# Patient Record
Sex: FEMALE | Race: WHITE | Employment: FULL TIME | URBAN - METROPOLITAN AREA
[De-identification: names, ages, dates, MRNs, and addresses within clinical notes are randomized per-mention and may not be internally consistent; named-entity substitution may affect disease eponyms.]

---

## 2018-03-22 ENCOUNTER — APPOINTMENT (OUTPATIENT)
Dept: CT IMAGING | Age: 53
End: 2018-03-22
Payer: COMMERCIAL

## 2018-03-22 ENCOUNTER — APPOINTMENT (OUTPATIENT)
Dept: GENERAL RADIOLOGY | Age: 53
End: 2018-03-22
Payer: COMMERCIAL

## 2018-03-22 ENCOUNTER — HOSPITAL ENCOUNTER (EMERGENCY)
Age: 53
Discharge: HOME | End: 2018-03-22
Attending: FAMILY MEDICINE
Payer: COMMERCIAL

## 2018-03-22 VITALS
RESPIRATION RATE: 16 BRPM | WEIGHT: 293 LBS | SYSTOLIC BLOOD PRESSURE: 178 MMHG | HEIGHT: 63 IN | TEMPERATURE: 98.7 F | BODY MASS INDEX: 51.91 KG/M2 | DIASTOLIC BLOOD PRESSURE: 80 MMHG | OXYGEN SATURATION: 93 % | HEART RATE: 64 BPM

## 2018-03-22 DIAGNOSIS — J20.9 ACUTE BRONCHITIS, UNSPECIFIED ORGANISM: Primary | ICD-10-CM

## 2018-03-22 PROCEDURE — 99284 EMERGENCY DEPT VISIT MOD MDM: CPT

## 2018-03-22 PROCEDURE — 71046 X-RAY EXAM CHEST 2 VIEWS: CPT

## 2018-03-22 RX ORDER — BENZONATATE 200 MG/1
200 CAPSULE ORAL 3 TIMES DAILY PRN
Qty: 15 CAPSULE | Refills: 0 | Status: SHIPPED | OUTPATIENT
Start: 2018-03-22 | End: 2018-03-29

## 2018-03-22 RX ORDER — AZITHROMYCIN 250 MG/1
TABLET, FILM COATED ORAL
Qty: 1 PACKET | Refills: 0 | Status: SHIPPED | OUTPATIENT
Start: 2018-03-22 | End: 2019-09-09

## 2018-03-22 RX ORDER — OMEPRAZOLE 20 MG/1
20 CAPSULE, DELAYED RELEASE ORAL EVERY 12 HOURS
Qty: 28 CAPSULE | Refills: 0 | Status: SHIPPED | OUTPATIENT
Start: 2018-03-22 | End: 2021-05-07

## 2018-03-22 RX ORDER — ALBUTEROL SULFATE 90 UG/1
2 AEROSOL, METERED RESPIRATORY (INHALATION) EVERY 6 HOURS PRN
Qty: 1 INHALER | Refills: 0 | Status: SHIPPED | OUTPATIENT
Start: 2018-03-22 | End: 2019-08-16 | Stop reason: SDUPTHER

## 2018-03-22 RX ORDER — NAPROXEN 500 MG/1
500 TABLET ORAL 2 TIMES DAILY WITH MEALS
Status: ON HOLD | COMMUNITY
End: 2021-10-14 | Stop reason: HOSPADM

## 2018-03-22 NOTE — ED NOTES
Patient present with cough and congestion x 1 week, productive of clear sputum .      Viola Jimenez, EDGARDO  03/22/18 0339

## 2018-03-22 NOTE — ED NOTES
Discharge instructions provided and reviewed. Pt verbalizes understanding and any questions answered at this time. Ambulated out without assistance.       Dawson Marquez RN  03/22/18 3848

## 2018-05-16 PROBLEM — G47.33 OSA ON CPAP: Status: ACTIVE | Noted: 2018-05-16

## 2018-05-16 PROBLEM — J45.20 MILD INTERMITTENT ASTHMA WITHOUT COMPLICATION: Status: ACTIVE | Noted: 2018-05-16

## 2018-05-16 PROBLEM — Z99.89 OSA ON CPAP: Status: ACTIVE | Noted: 2018-05-16

## 2018-12-10 ENCOUNTER — HOSPITAL ENCOUNTER (OUTPATIENT)
Age: 53
Discharge: HOME | End: 2018-12-12
Payer: COMMERCIAL

## 2018-12-10 PROCEDURE — 87088 URINE BACTERIA CULTURE: CPT

## 2018-12-13 LAB — URINE CULTURE, ROUTINE: NORMAL

## 2019-07-02 ENCOUNTER — HOSPITAL ENCOUNTER (OUTPATIENT)
Age: 54
Discharge: HOME | End: 2019-07-04
Payer: COMMERCIAL

## 2019-07-02 LAB
ALBUMIN SERPL-MCNC: 3.7 G/DL (ref 3.5–5.2)
ALP BLD-CCNC: 85 U/L (ref 35–104)
ALT SERPL-CCNC: 20 U/L (ref 0–32)
ANION GAP SERPL CALCULATED.3IONS-SCNC: 14 MMOL/L (ref 7–16)
AST SERPL-CCNC: 18 U/L (ref 0–31)
BILIRUB SERPL-MCNC: 0.5 MG/DL (ref 0–1.2)
BUN BLDV-MCNC: 15 MG/DL (ref 6–20)
CALCIUM SERPL-MCNC: 9.7 MG/DL (ref 8.6–10.2)
CHLORIDE BLD-SCNC: 98 MMOL/L (ref 98–107)
CHOLESTEROL, TOTAL: 157 MG/DL (ref 0–199)
CO2: 31 MMOL/L (ref 22–29)
CREAT SERPL-MCNC: 0.6 MG/DL (ref 0.5–1)
GFR AFRICAN AMERICAN: >60
GFR NON-AFRICAN AMERICAN: >60 ML/MIN/1.73
GLUCOSE BLD-MCNC: 183 MG/DL (ref 74–99)
HBA1C MFR BLD: 9.2 % (ref 4–5.6)
HCT VFR BLD CALC: 48.1 % (ref 34–48)
HDLC SERPL-MCNC: 35 MG/DL
HEMOGLOBIN: 14.9 G/DL (ref 11.5–15.5)
LDL CHOLESTEROL CALCULATED: 85 MG/DL (ref 0–99)
MCH RBC QN AUTO: 30.2 PG (ref 26–35)
MCHC RBC AUTO-ENTMCNC: 31 % (ref 32–34.5)
MCV RBC AUTO: 97.6 FL (ref 80–99.9)
PDW BLD-RTO: 13.2 FL (ref 11.5–15)
PLATELET # BLD: 235 E9/L (ref 130–450)
PMV BLD AUTO: 11.3 FL (ref 7–12)
POTASSIUM SERPL-SCNC: 4.6 MMOL/L (ref 3.5–5)
RBC # BLD: 4.93 E12/L (ref 3.5–5.5)
SODIUM BLD-SCNC: 143 MMOL/L (ref 132–146)
TOTAL PROTEIN: 7.6 G/DL (ref 6.4–8.3)
TRIGL SERPL-MCNC: 183 MG/DL (ref 0–149)
VLDLC SERPL CALC-MCNC: 37 MG/DL
WBC # BLD: 7.5 E9/L (ref 4.5–11.5)

## 2019-07-02 PROCEDURE — 80061 LIPID PANEL: CPT

## 2019-07-02 PROCEDURE — 80053 COMPREHEN METABOLIC PANEL: CPT

## 2019-07-02 PROCEDURE — 83036 HEMOGLOBIN GLYCOSYLATED A1C: CPT

## 2019-07-02 PROCEDURE — 85027 COMPLETE CBC AUTOMATED: CPT

## 2019-09-09 PROBLEM — K21.9 GASTROESOPHAGEAL REFLUX DISEASE WITHOUT ESOPHAGITIS: Status: ACTIVE | Noted: 2019-09-09

## 2019-09-09 PROBLEM — E66.01 MORBID OBESITY (HCC): Status: ACTIVE | Noted: 2019-09-09

## 2021-05-07 ENCOUNTER — HOSPITAL ENCOUNTER (OUTPATIENT)
Age: 56
Discharge: HOME | End: 2021-05-09
Payer: COMMERCIAL

## 2021-05-07 DIAGNOSIS — Z01.818 PREOP TESTING: ICD-10-CM

## 2021-05-08 LAB
SARS-COV-2: NOT DETECTED
SOURCE: NORMAL

## 2021-08-31 NOTE — H&P
unspecified  Obesity, unspecified  Type 2 diabetes mellitus without complications       FAMILY HISTORY: Diabetes - Mother     SOCIAL HISTORY: Marital Status:   Preferred Language: English; Ethnicity: Not  Or ; Race: White  Current Smoking Status: Patient does not smoke anymore. Does not use smokeless tobacco.  Does not use drugs. Does not drink caffeine. Patient's occupation is/was [employer]. Froilan Morales Notes: ONE DAUGHTER     REVIEW OF SYSTEMS:     Constitutional:   Patient denies fever, chills, and weight loss. Eyes:   Patient denies wearing glasses. Ears, Nose, Mouth, Throat:   Patient denies hearing loss. Cardiovascular:   Patient denies chest pains, swollen ankles, and irregular heartbeat. Respiratory:   Patient denies shortness of breath, wheezing, and chronic cough. Gastrointestinal:   Patient denies abdominal pain, nausea/vomiting, and change in bowels. Genitourinary:   Patient reports incontinence. Patient denies painful urination, blood in urine, howard catheter, and suprapubic catheter. Musculoskeletal:   Patient denies using cane, using walker, and using wheelchair. Integumentary/Skin:   Patient denies rash, persistent itching, and skin cancer history. Neurological:   Patient denies numbness, tingling, dizziness, and altered mental status. Hematologic/Lymphatic:   Patient denies swollen glands, abnormal bleeding, and history blood transfusion. Notes: Reviewed previous review of systems 01/24/2020. No changes. VITAL SIGNS:         Pulse 74 /min   Resp. Rate 16 /min      PHYSICAL EXAMINATION:     External Genitalia: No hirsutism, no rash, no scarring, no cyst, no erythematous lesion, no papular lesion, no blanched lesion, no warty lesion. No edema. Urethral Meatus: Normal size. Normal position. No discharge. Urethra: No tenderness, no mass, no scarring. No hypermobility. No leakage.    Bladder: Normal to palpation, no tenderness, no mass, normal size. Vagina: No atrophy, no stenosis. No rectocele. No cystocele. No enterocele. Cervix: No inflammation, no discharge, no lesion, no tenderness, no wart. Uterus: Normal size. Normal consistency. Normal position. No mobility. No descent. MULTI-SYSTEM PHYSICAL EXAMINATION:     Constitutional: Obese. No physical deformities. Normally developed. Good grooming. Neck: Neck symmetrical, not swollen. Normal tracheal position. Respiratory: No labored breathing, no use of accessory muscles. Cardiovascular: Normal temperature, normal extremity pulses, no swelling, no varicosities. Lymphatic: No enlargement of neck, axillae, groin. Skin: No paleness, no jaundice, no cyanosis. No lesion, no ulcer, no rash. Neurologic / Psychiatric: Oriented to time, oriented to place, oriented to person. No depression, no anxiety, no agitation. Gastrointestinal: No mass, no tenderness, no rigidity, non obese abdomen. Eyes: Normal conjunctivae. Normal eyelids. Ears, Nose, Mouth, and Throat: Left ear no scars, no lesions, no masses. Right ear no scars, no lesions, no masses. Nose no scars, no lesions, no masses. Normal hearing. Normal lips. Musculoskeletal: Normal gait and station of head and neck. PAST DATA REVIEWED:   Source Of History:  Patient   Records Review:   Previous Patient Records       ASSESSMENT:       ICD-10 Details   1 :   Overactive bladder - N32.81 Stable   2   Mixed incontinence - N39.46 Stable     PLAN:               Medications  New Meds: Bactrim Ds 800 mg-160 mg tablet 1 tablet PO BID   #6  0 Refill(s)   Diflucan 150 mg tablet 1 tablet PO Daily   #1  0 Refill(s)          Schedule:  Cysto/Botox      The patient and I talked at length about conservative treatment of overactive bladder.  Etiologies of overactive bladder including neurogenic bladder, stroke, Parkinson's disease, multiple sclerosis, neurological diseases, cystitis, UTI's, anxiety, extensive water drinking, and others were discussed with the patient. Alternative treatment options were discussed with the patient in detail. All questions were answered. The patient gave fully informed consent to proceed with conservative therapy for their overactive bladder. The patient was given instructions to call for abdominal pain, pelvic pain, perirectal pain, nausea, vomiting, diarrhea, fever over 100? ?F, chills, hematuria, dysuria, frequency, urgency, or urge incontinence. I AGAIN ENCOURAGED HER TO INCREASE HER ORAL FLUIDS AND TAKE BACTRIM DS 1 PO BID FOR 3 DAYS. I ENCOURAGED STRICT DIABETIC CONTROL ONCE AGAIN. SHE IS DOING WELL WITH BOTOX.  ORAL OAB MEDS REMAIN INEFFECTIVE FOR HER AND WILL NO LONGER BE PRESCRIBED

## 2021-09-09 ENCOUNTER — ANESTHESIA EVENT (OUTPATIENT)
Dept: OPERATING ROOM | Age: 56
End: 2021-09-09
Payer: COMMERCIAL

## 2021-09-09 ENCOUNTER — HOSPITAL ENCOUNTER (OUTPATIENT)
Age: 56
Setting detail: OUTPATIENT SURGERY
Discharge: HOME | End: 2021-09-09
Attending: OBSTETRICS & GYNECOLOGY | Admitting: OBSTETRICS & GYNECOLOGY
Payer: COMMERCIAL

## 2021-09-09 ENCOUNTER — ANESTHESIA (OUTPATIENT)
Dept: OPERATING ROOM | Age: 56
End: 2021-09-09
Payer: COMMERCIAL

## 2021-09-09 VITALS
DIASTOLIC BLOOD PRESSURE: 98 MMHG | SYSTOLIC BLOOD PRESSURE: 219 MMHG | WEIGHT: 293 LBS | HEIGHT: 63 IN | TEMPERATURE: 97 F | HEART RATE: 75 BPM | BODY MASS INDEX: 51.91 KG/M2 | RESPIRATION RATE: 14 BRPM

## 2021-09-09 DIAGNOSIS — N93.8 DYSFUNCTIONAL UTERINE BLEEDING: Primary | ICD-10-CM

## 2021-09-09 LAB
ABO/RH: NORMAL
ANION GAP SERPL CALCULATED.3IONS-SCNC: 9 MMOL/L (ref 7–16)
ANTIBODY SCREEN: NORMAL
BUN BLDV-MCNC: 13 MG/DL (ref 6–20)
CALCIUM SERPL-MCNC: 9.3 MG/DL (ref 8.6–10.2)
CHLORIDE BLD-SCNC: 98 MMOL/L (ref 98–107)
CO2: 30 MMOL/L (ref 22–29)
CREAT SERPL-MCNC: 0.5 MG/DL (ref 0.5–1)
EKG ATRIAL RATE: 67 BPM
EKG P AXIS: 26 DEGREES
EKG P-R INTERVAL: 168 MS
EKG Q-T INTERVAL: 398 MS
EKG QRS DURATION: 88 MS
EKG QTC CALCULATION (BAZETT): 420 MS
EKG R AXIS: 10 DEGREES
EKG T AXIS: 49 DEGREES
EKG VENTRICULAR RATE: 67 BPM
GFR AFRICAN AMERICAN: >60
GFR NON-AFRICAN AMERICAN: >60 ML/MIN/1.73
GLUCOSE BLD-MCNC: 231 MG/DL (ref 74–99)
HCT VFR BLD CALC: 43.9 % (ref 34–48)
HEMOGLOBIN: 14.3 G/DL (ref 11.5–15.5)
MCH RBC QN AUTO: 31 PG (ref 26–35)
MCHC RBC AUTO-ENTMCNC: 32.6 % (ref 32–34.5)
MCV RBC AUTO: 95 FL (ref 80–99.9)
PDW BLD-RTO: 13.6 FL (ref 11.5–15)
PLATELET # BLD: 182 E9/L (ref 130–450)
PMV BLD AUTO: 10.6 FL (ref 7–12)
POTASSIUM SERPL-SCNC: 4.1 MMOL/L (ref 3.5–5)
RBC # BLD: 4.62 E12/L (ref 3.5–5.5)
SODIUM BLD-SCNC: 137 MMOL/L (ref 132–146)
WBC # BLD: 6 E9/L (ref 4.5–11.5)

## 2021-09-09 PROCEDURE — 86850 RBC ANTIBODY SCREEN: CPT

## 2021-09-09 PROCEDURE — 2580000003 HC RX 258: Performed by: UROLOGY

## 2021-09-09 PROCEDURE — 85027 COMPLETE CBC AUTOMATED: CPT

## 2021-09-09 PROCEDURE — 36415 COLL VENOUS BLD VENIPUNCTURE: CPT

## 2021-09-09 PROCEDURE — 93005 ELECTROCARDIOGRAM TRACING: CPT

## 2021-09-09 PROCEDURE — 86901 BLOOD TYPING SEROLOGIC RH(D): CPT

## 2021-09-09 PROCEDURE — 86900 BLOOD TYPING SEROLOGIC ABO: CPT

## 2021-09-09 PROCEDURE — 80048 BASIC METABOLIC PNL TOTAL CA: CPT

## 2021-09-09 RX ORDER — ONDANSETRON 2 MG/ML
4 INJECTION INTRAMUSCULAR; INTRAVENOUS EVERY 6 HOURS PRN
Status: DISCONTINUED | OUTPATIENT
Start: 2021-09-09 | End: 2021-09-09 | Stop reason: HOSPADM

## 2021-09-09 RX ORDER — SODIUM CHLORIDE 0.9 % (FLUSH) 0.9 %
5-40 SYRINGE (ML) INJECTION EVERY 12 HOURS SCHEDULED
Status: DISCONTINUED | OUTPATIENT
Start: 2021-09-09 | End: 2021-09-09 | Stop reason: HOSPADM

## 2021-09-09 RX ORDER — SODIUM CHLORIDE 9 MG/ML
25 INJECTION, SOLUTION INTRAVENOUS PRN
Status: DISCONTINUED | OUTPATIENT
Start: 2021-09-09 | End: 2021-09-09 | Stop reason: HOSPADM

## 2021-09-09 RX ORDER — SODIUM CHLORIDE 0.9 % (FLUSH) 0.9 %
5-40 SYRINGE (ML) INJECTION PRN
Status: DISCONTINUED | OUTPATIENT
Start: 2021-09-09 | End: 2021-09-09 | Stop reason: HOSPADM

## 2021-09-09 RX ORDER — SODIUM CHLORIDE 0.9 % (FLUSH) 0.9 %
10 SYRINGE (ML) INJECTION PRN
Status: DISCONTINUED | OUTPATIENT
Start: 2021-09-09 | End: 2021-09-09 | Stop reason: HOSPADM

## 2021-09-09 RX ORDER — SODIUM CHLORIDE 0.9 % (FLUSH) 0.9 %
10 SYRINGE (ML) INJECTION EVERY 12 HOURS SCHEDULED
Status: DISCONTINUED | OUTPATIENT
Start: 2021-09-09 | End: 2021-09-09 | Stop reason: HOSPADM

## 2021-09-09 RX ORDER — SODIUM CHLORIDE 9 MG/ML
INJECTION, SOLUTION INTRAVENOUS CONTINUOUS
Status: DISCONTINUED | OUTPATIENT
Start: 2021-09-09 | End: 2021-09-09 | Stop reason: HOSPADM

## 2021-09-09 RX ORDER — GENTAMICIN SULFATE 80 MG/50ML
80 INJECTION, SOLUTION INTRAVENOUS
Status: DISCONTINUED | OUTPATIENT
Start: 2021-09-09 | End: 2021-09-09 | Stop reason: HOSPADM

## 2021-09-09 RX ORDER — MEDROXYPROGESTERONE ACETATE 10 MG/1
10 TABLET ORAL DAILY
Qty: 30 TABLET | Refills: 1 | Status: ON HOLD | OUTPATIENT
Start: 2021-09-09 | End: 2021-10-14 | Stop reason: HOSPADM

## 2021-09-09 RX ADMIN — SODIUM CHLORIDE: 9 INJECTION, SOLUTION INTRAVENOUS at 12:05

## 2021-09-09 NOTE — PROGRESS NOTES
Anesthesia cancelling case due to high blood pressure. Dr. Octavia Dumont and anesthesia spoke with patient.
Have you been tested for COVID  Yes           Have you been told you were positive for COVID Yes  Have you had any known exposure to someone that is positive for COVID No  Do you have a cough                   No              Do you have shortness of breath No                 Do you have a sore throat            No                Are you having chills                    No                Are you having muscle aches. No                    Please come to the hospital wearing a mask and have your significant other wear a mask as well. Both of you should check your temperature before leaving to come here,  if it is 100 or higher please call [phone] for instruction.
Left a message to bring covid vaccination card. We did not get in through email.
Patient presented for elective surgery but has been hypertensive. She is on 2 antihypertensive medications. Her pressures have ranged from 426-339 systolic. She denies headache, chest pain, blurry vision. Will cancel case due to high perioperative risk of cardiac events. Both surgeons informed. Explained to patient that she can go home and needs to follow up with PCP.
Pt had both vaccines.  To email or fax card
Scheduled for surgery today however systolic blood pressures 556-4 30. Taking 2 antihypertensive medications. Patient has no complaints currently. Per recommendation of anesthesia case canceled today with follow-up with her PCP for further medical clearance and treatment.   Treatment plan discussed with Dr. Rick Baltazar and anesthesia Dr. Mat Gomez
Updated anesthesia that blood pressure is elevated after taking it 3 times.
to procedure to notify you if your arrival time changes    [x] If you receive a survey after surgery we would greatly appreciate your comments    [] Parent/guardian of a minor must accompany their child and remain on the premises  the entire time they are under our care     [] Pediatric patients may bring favorite toy, blanket or comfort item with them    [] A caregiver or family member must remain with the patient during their stay if they are mentally handicapped, have dementia, disoriented or unable to use a call light or would be a safety concern if left unattended    [x] Please notify surgeon if you develop any illness between now and time of surgery (cold, cough, sore throat, fever, nausea, vomiting) or any signs of infections  including skin, wounds, and dental.    [x]  The Outpatient Pharm[facility] is available to fill your prescription here on your day of surgery, ask your preop nurse for details    [] Other instructions    EDUCATIONAL MATERIALS PROVIDED:    [] PAT Preoperative Education Packet/Booklet     [] Medication List    [] Transfusion bracelet applied with instructions    [] Shower with soap, lather and rinse well, and use CHG wipes provided the evening before surgery as instructed    [] Incentive spirometer with instructions

## 2021-09-09 NOTE — H&P
I have an overactive bladder. HPI: Kirsty Levy is a 60year-old female established patient who is here for evaluation of their overactive bladder.     She does have urgency. She does have problems getting to the bathroom in time after she has the urge to urinate. She does urinate more frequently than once every 4 hours in the daytime. She is not taking any medication for over active bladder.      She is not having problems with emptying her bladder well.         CC/HPI: SHE IS HERE TODAY FOR INTRAVESICAL BOTOX INJECTION   SHE FEELS BOTOX STILL IS EFFECTIVE FOR HER   HER BLOOD SUGAR  RECENTLY   NO RECENT UTI'S   SHE VOIDS OFTEN   NO GROSS HEMATURIA   SHE NO LONGER TAKES OAB MEDS         ALLERGIES: Penicillin        MEDICATIONS: Aleve 220 mg tablet   Atorvastatin Calcium 20 mg tablet   Botox 200 unit vial 1 vial Intravesically Q6M DX: N32.81   Diovan Hct 160 mg-25 mg tablet   Dulera 100 mcg-5 mcg/actuation hfa aerosol with adapter   Fluoxetine Hcl 40 mg capsule   Metformin Hcl Er 500 mg tablet, extended release 24 hr   Metoprolol Tartrate 25 mg tablet          PSH: Complex cystometrogram, w/ void pressure and urethral pressure profile studies, any technique - 2014  Complex Uroflow - 2014  Cysto Bladder Biopsy - 2012  Cystourethroscopy, W/Injection For Chemodenervation Of Bladder, BOTOX 200 UNITS - 1/24/2020 - at [address] [place] [address] B - [facility] - [id number], BOTOX 200 UNITS - 6/14/2019 - at [address] [place] [address] A - [facility] [address] [place], [phone] -[phone]-, 11/9/2018, 2018, 2017, 2016, 2016, 2015           NON- PSH: Emg surf Electrd - 2014  Intrabd [employer] - 2014  Urinary Reflex Study - 2014            PMH: Mixed incontinence - 2014  Gross hematuria  Overactive bladder  Urinary Tract Inf, Unspec site        NON- PMH: Essential (primary) hypertension  Gastro-esophageal reflux disease without esophagitis  Glycosuria  Major depressive disorder, single episode, unspecified  Obesity, unspecified  Type 2 diabetes mellitus without complications        FAMILY HISTORY: Diabetes - Mother      SOCIAL HISTORY: Marital Status:   Preferred Language: English; Ethnicity: Not  Or ; Race: White  Current Smoking Status: Patient does not smoke anymore. Does not use smokeless tobacco.  Does not use drugs. Does not drink caffeine. Patient's occupation is/was [employer]. [employer] Notes: ONE DAUGHTER      REVIEW OF SYSTEMS:     Constitutional:   Patient denies fever, chills, and weight loss. Eyes:   Patient denies wearing glasses. Ears, Nose, Mouth, Throat:   Patient denies hearing loss. Cardiovascular:   Patient denies chest pains, swollen ankles, and irregular heartbeat. Respiratory:   Patient denies shortness of breath, wheezing, and chronic cough. Gastrointestinal:   Patient denies abdominal pain, nausea/vomiting, and change in bowels. Genitourinary:   Patient reports incontinence. Patient denies painful urination, blood in urine, howard catheter, and suprapubic catheter. Musculoskeletal:   Patient denies using cane, using walker, and using wheelchair. Integumentary/Skin:   Patient denies rash, persistent itching, and skin cancer history. Neurological:   Patient denies numbness, tingling, dizziness, and altered mental status. Hematologic/Lymphatic:   Patient denies swollen glands, abnormal bleeding, and history blood transfusion.      Notes: Reviewed previous review of systems 01/24/2020. No changes.      VITAL SIGNS:         Pulse 74 /min   Resp. Rate 16 /min       PHYSICAL EXAMINATION:     External Genitalia: No hirsutism, no rash, no scarring, no cyst, no erythematous lesion, no papular lesion, no blanched lesion, no warty lesion. No edema. Urethral Meatus: Normal size. Normal position. No discharge. Urethra: No tenderness, no mass, no scarring. No hypermobility. No leakage.    Bladder: Normal to palpation, no tenderness, no mass, normal size. Vagina: No atrophy, no stenosis. No rectocele. No cystocele. No enterocele. Cervix: No inflammation, no discharge, no lesion, no tenderness, no wart. Uterus: Normal size. Normal consistency. Normal position. No mobility. No descent.      MULTI-SYSTEM PHYSICAL EXAMINATION:     Constitutional: Obese. No physical deformities. Normally developed. Good grooming. Neck: Neck symmetrical, not swollen. Normal tracheal position. Respiratory: No labored breathing, no use of accessory muscles. Cardiovascular: Normal temperature, normal extremity pulses, no swelling, no varicosities. Lymphatic: No enlargement of neck, axillae, groin. Skin: No paleness, no jaundice, no cyanosis. No lesion, no ulcer, no rash. Neurologic / Psychiatric: Oriented to time, oriented to place, oriented to person. No depression, no anxiety, no agitation. Gastrointestinal: No mass, no tenderness, no rigidity, non obese abdomen. Eyes: Normal conjunctivae. Normal eyelids. Ears, Nose, Mouth, and Throat: Left ear no scars, no lesions, no masses. Right ear no scars, no lesions, no masses. Nose no scars, no lesions, no masses. Normal hearing. Normal lips. Musculoskeletal: Normal gait and station of head and neck.         PAST DATA REVIEWED:   Source Of History:  Patient   Records Review:   Previous Patient Records         ASSESSMENT:       ICD-10 Details   1 :   Overactive bladder - N32.81 Stable   2   Mixed incontinence - N39.46 Stable      PLAN:                 Medications  New Meds: Bactrim Ds 800 mg-160 mg tablet 1 tablet PO BID   #6  0 Refill(s)   Diflucan 150 mg tablet 1 tablet PO Daily   #1  0 Refill(s)            Schedule:  Cysto/Botox        The patient and I talked at length about conservative treatment of overactive bladder.  Etiologies of overactive bladder including neurogenic bladder, stroke, Parkinson's disease, multiple sclerosis, neurological diseases, cystitis, UTI's, anxiety, extensive water drinking, and others were discussed with the patient. Alternative treatment options were discussed with the patient in detail. All questions were answered.     The patient gave fully informed consent to proceed with conservative therapy for their overactive bladder.     The patient was given instructions to call for abdominal pain, pelvic pain, perirectal pain, nausea, vomiting, diarrhea, fever over 100? ?F, chills, hematuria, dysuria, frequency, urgency, or urge incontinence.         I AGAIN ENCOURAGED HER TO INCREASE HER ORAL FLUIDS AND TAKE BACTRIM DS 1 PO BID FOR 3 DAYS. I ENCOURAGED STRICT DIABETIC CONTROL ONCE AGAIN. SHE IS DOING WELL WITH BOTOX.  ORAL OAB MEDS REMAIN INEFFECTIVE FOR HER AND WILL NO LONGER BE PRESCRIBED

## 2021-09-09 NOTE — H&P
Department of Gynecology   History and Physical        CHIEF COMPLAINT:   Irregular menstrual period    Reason for Admission: Hysteroscopy, dilatation and curettage    History obtained from patient    HISTORY OF PRESENT ILLNESS:                     The patient is a 60 y.o. female  who presents with irregular menstrual periods      Past Medical History:        Diagnosis Date    Asthma     COVID-19 11/2020    Depression     Diabetes mellitus ([facility] [address]     Hyperlipidemia     Hypertension     [employer] on CPAP     Postmenopausal bleeding      Past Surgical History:        Procedure Laterality Date    BACK SURGERY  1990    lumbar laminectomy    COLONOSCOPY      TONSILLECTOMY         Past Gynecological History:    1. Last menstrual period: unknown  2. Menses: interval:  irregular  3. Contraception: None  4. Sexually transmitted disease history: none5. Pap History: Patient does not recall date of the last Pap test but reports the results as normal.           6. Date of last mammogram: Patient does not recall date of the last mammogram but reports the results were normal    OB History   No obstetric history on file. Medications Prior to Admission:   No medications prior to admission.     Allergies:  Penicillins     Social History:  Reviewed    Family History:       Problem Relation Age of Onset    Diabetes Mother     Hypertension Mother     Kidney Disease Father     Heart Attack Father        REVIEW OF SYSTEMS:      Constitutional:  negative  Gastrointestinal:  negative  Genitourinary:  negative  Integument/breast:  negative  Hematologic/lymphatic:  negative  Endocrine:  negative  Behavior/Psych:  negative    PHYSICAL EXAM:    Vitals:  Ht 5' 3\" (1.6 m)   Wt (!) 322 lb (146.1 kg)   BMI 57.04 kg/m²     External Genitalia: General appearance; normal, Hair distribution; normal, Lesions absent  Vagina: General appearance normal, Estrogen effect normal, Discharge absent, Lesions absent, Pelvic support normal  Cervix: General appearance normal, Lesions absent, Discharge absent, Tenderness absent, Enlargement absent, Nodularity absent  Uterus:  Size normal, Contour normal, Position normal, Masses absent, Consistency; normal, Support normal, Tenderness absent, occult due to morbid obesity  [place]: Masses absent, Tenderness absent, Enlargement absent, Nodularity absent, difficult due to patient's morbid obesity  LUNGS:  No increased work of breathing, good air exchange, clear to auscultation bilaterally, no crackles or wheezing  CARDIOVASCULAR:  Normal apical impulse, regular rate and rhythm, normal S1 and S2, no S3 or S4, and no murmur noted    DATA:  Radiology Review: Ultrasound revealed a 0.64 cm endometrium ovaries not visualized and uterus 9.6 x 5.2 cm      ASSESSMENT AND PLAN:    58-year-old  1 para 1  Irregular menstrual periods. Attempted office endometrial biopsy but patient could not tolerate lying flat due to shortness of breath therefore hysteroscopy D and C under anesthesia to be performed consent obtained    Active Problems:    * No active hospital problems.  Amelia Gonzalez MD 2021 10:59 PM

## 2021-10-06 NOTE — H&P
complications       FAMILY HISTORY: Diabetes - Mother     SOCIAL HISTORY: Marital Status:   Preferred Language: English; Ethnicity: Not  Or ; Race: White  Current Smoking Status: Patient does not smoke anymore. Does not use smokeless tobacco.  Does not use drugs. Does not drink caffeine. Patient's occupation is/was [employer] Joaquina Levy Notes: ONE DAUGHTER     REVIEW OF SYSTEMS:     Constitutional:   Patient denies fever, chills, and weight loss. Eyes:   Patient denies wearing glasses. Ears, Nose, Mouth, Throat:   Patient denies hearing loss. Cardiovascular:   Patient denies chest pains, swollen ankles, and irregular heartbeat. Respiratory:   Patient denies shortness of breath, wheezing, and chronic cough. Gastrointestinal:   Patient denies abdominal pain, nausea/vomiting, and change in bowels. Genitourinary:   Patient reports incontinence. Patient denies painful urination, blood in urine, howard catheter, and suprapubic catheter. Musculoskeletal:   Patient denies using cane, using walker, and using wheelchair. Integumentary/Skin:   Patient denies rash, persistent itching, and skin cancer history. Neurological:   Patient denies numbness, tingling, dizziness, and altered mental status. Hematologic/Lymphatic:   Patient denies swollen glands, abnormal bleeding, and history blood transfusion. Notes: Reviewed previous review of systems 01/24/2020. No changes. VITAL SIGNS:        Pulse 74 /min   Resp. Rate 16 /min      PHYSICAL EXAMINATION:     External Genitalia: No hirsutism, no rash, no scarring, no cyst, no erythematous lesion, no papular lesion, no blanched lesion, no warty lesion. No edema. Urethral Meatus: Normal size. Normal position. No discharge. Urethra: No tenderness, no mass, no scarring. No hypermobility. No leakage. Bladder: Normal to palpation, no tenderness, no mass, normal size. Vagina: No atrophy, no stenosis. No rectocele. No cystocele. No enterocele. Cervix: No inflammation, no discharge, no lesion, no tenderness, no wart. Uterus: Normal size. Normal consistency. Normal position. No mobility. No descent. MULTI-SYSTEM PHYSICAL EXAMINATION:     Constitutional: Obese. No physical deformities. Normally developed. Good grooming. Neck: Neck symmetrical, not swollen. Normal tracheal position. Respiratory: No labored breathing, no use of accessory muscles. Cardiovascular: Normal temperature, normal extremity pulses, no swelling, no varicosities. Lymphatic: No enlargement of neck, axillae, groin. Skin: No paleness, no jaundice, no cyanosis. No lesion, no ulcer, no rash. Neurologic / Psychiatric: Oriented to time, oriented to place, oriented to person. No depression, no anxiety, no agitation. Gastrointestinal: No mass, no tenderness, no rigidity, non obese abdomen. Eyes: Normal conjunctivae. Normal eyelids. Ears, Nose, Mouth, and Throat: Left ear no scars, no lesions, no masses. Right ear no scars, no lesions, no masses. Nose no scars, no lesions, no masses. Normal hearing. Normal lips. Musculoskeletal: Normal gait and station of head and neck. PAST DATA REVIEWED:   Source Of History:  Patient   Records Review:   Previous Patient Records     PROCEDURES:            Botox Injection 200u - 15847, [id number]  Risks, benefits, and some of the potential complications of the procedure were discussed at length with the patient including infection, bleeding, voiding discomfort, urinary retention, fever, chills, sepsis, and others. All questions were answered. Informed consent was signed. Sterile technique and intraurethral analgesia were used.       ASSESSMENT:       ICD-10 Details   1 :   Overactive bladder - N32.81 Stable   2   Mixed incontinence - N39.46 Stable     PLAN:               Medications  New Meds: Bactrim Ds 800 mg-160 mg tablet 1 tablet PO BID   #6  0 Refill(s)   Diflucan 150 mg tablet 1 tablet PO Daily #1  0 Refill(s)        Schedule:  Cysto/Botox injection    The patient and I talked at length about conservative treatment of overactive bladder. Etiologies of overactive bladder including neurogenic bladder, stroke, Parkinson's disease, multiple sclerosis, neurological diseases, cystitis, UTI's, anxiety, extensive water drinking, and others were discussed with the patient. Alternative treatment options were discussed with the patient in detail. All questions were answered. The patient gave fully informed consent to proceed with conservative therapy for their overactive bladder. The patient was given instructions to call for abdominal pain, pelvic pain, perirectal pain, nausea, vomiting, diarrhea, fever over 100? ?F, chills, hematuria, dysuria, frequency, urgency, or urge incontinence. I ENCOURAGED STRICT DIABETIC CONTROL ONCE AGAIN. SHE IS DOING WELL WITH BOTOX.  ORAL OAB MEDS REMAIN INEFFECTIVE FOR HER AND WILL NO LONGER BE PRESCRIBED

## 2021-10-12 NOTE — PROGRESS NOTES
Have you been tested for COVID  No  vaccinated         Have you been told you were positive for COVID No  Have you had any known exposure to someone that is positive for COVID No  Do you have a cough                   No              Do you have shortness of breath No                 Do you have a sore throat            No                  Are you having chills                    No                Are you having muscle aches. No                    Please come to the hospital wearing a mask and have your significant other wear a mask as well. Both of you should check your temperature before leaving to come here,  if it is 100 or higher please call [phone] for instruction.

## 2021-10-12 NOTE — PROGRESS NOTES
[facility] PRE-ADMISSION TESTING INSTRUCTIONS    The Preadmission Testing patient is instructed accordingly using the following criteria (check applicable):    ARRIVAL INSTRUCTIONS:  [x] Parking the day of Surgery is located in the Main Entrance lot. Upon entering the door, make an immediate right to the surgery reception desk    [x] Bring photo ID and insurance card    [] Bring in a copy of Living will or Durable Power of  papers. [x] Please be sure to arrange for responsible adult to provide transportation to and from the hospital    [x] Please arrange for responsible adult to be with you for the 24 hour period post procedure due to having anesthesia      GENERAL INSTRUCTIONS:    [x] Nothing by mouth after midnight, including gum, candy, mints or water    [x] You may brush your teeth, but do not swallow any water    [x] Take medications as instructed with 1-2 oz of water    [x] Stop herbal supplements and vitamins 5 days prior to procedure    [] Follow preop dosing of blood thinners per physician instructions    [] Take 1/2 dose of evening insulin, but no insulin after midnight    [x] No oral diabetic medications after midnight    [x] If diabetic and have low blood sugar or feel symptomatic, take 1-2oz apple juice only    [x] Bring inhalers day of surgery    [] Bring C-PAP/ Bi-Pap day of surgery    [x] Bring urine specimen day of surgery    [x] Shower or bath with soap, lather and rinse well, AM of Surgery, no lotion, powders or creams to surgical site    [] Follow bowel prep as instructed per surgeon    [x] No tobacco products within 24 hours of surgery     [x] No alcohol or illegal drug use within 24 hours of surgery.     [x] Jewelry, body piercing's, eyeglasses, contact lenses and dentures are not permitted into surgery (bring cases)      [x] Please do not wear any nail polish, make up or hair products on the day of surgery    [x] You can expect a call the business day prior to procedure to notify you if your arrival time changes    [x] If you receive a survey after surgery we would greatly appreciate your comments    [] Parent/guardian of a minor must accompany their child and remain on the premises  the entire time they are under our care     [] Pediatric patients may bring favorite toy, blanket or comfort item with them    [] A caregiver or family member must remain with the patient during their stay if they are mentally handicapped, have dementia, disoriented or unable to use a call light or would be a safety concern if left unattended    [x] Please notify surgeon if you develop any illness between now and time of surgery (cold, cough, sore throat, fever, nausea, vomiting) or any signs of infections  including skin, wounds, and dental.    [x]  The Outpatient Pharmacy is available to fill your prescription here on your day of surgery, ask your preop nurse for details    [] Other instructions    EDUCATIONAL MATERIALS PROVIDED:    [] PAT Preoperative Education Packet/Booklet     [] Medication List    [] Transfusion bracelet applied with instructions    [] Shower with soap, lather and rinse well, and use CHG wipes provided the evening before surgery as instructed    [] Incentive spirometer with instructions

## 2021-10-12 NOTE — PROGRESS NOTES
Frederick Diaz at Dr. Digna Pal' office notified patient scheduled for OR 10/14/21 with Dr. Digna Pal and Dr. Iraida Rivera- orders in for [facility] anesthesia per Dr. Perla Wiley. Confirmed by Dr. Digna Pal will be general anesthesia. Left message regarding this for Vivian Carcamo at [facility].

## 2021-10-14 ENCOUNTER — ANESTHESIA EVENT (OUTPATIENT)
Dept: OPERATING ROOM | Age: 56
End: 2021-10-14
Payer: COMMERCIAL

## 2021-10-14 ENCOUNTER — ANESTHESIA (OUTPATIENT)
Dept: OPERATING ROOM | Age: 56
End: 2021-10-14
Payer: COMMERCIAL

## 2021-10-14 ENCOUNTER — HOSPITAL ENCOUNTER (OUTPATIENT)
Age: 56
Setting detail: OUTPATIENT SURGERY
Discharge: HOME | End: 2021-10-14
Attending: OBSTETRICS & GYNECOLOGY | Admitting: OBSTETRICS & GYNECOLOGY
Payer: COMMERCIAL

## 2021-10-14 VITALS
TEMPERATURE: 93.2 F | OXYGEN SATURATION: 93 % | DIASTOLIC BLOOD PRESSURE: 86 MMHG | RESPIRATION RATE: 1 BRPM | SYSTOLIC BLOOD PRESSURE: 171 MMHG

## 2021-10-14 VITALS
WEIGHT: 293 LBS | RESPIRATION RATE: 18 BRPM | OXYGEN SATURATION: 92 % | HEIGHT: 64 IN | DIASTOLIC BLOOD PRESSURE: 62 MMHG | HEART RATE: 80 BPM | BODY MASS INDEX: 50.02 KG/M2 | SYSTOLIC BLOOD PRESSURE: 145 MMHG | TEMPERATURE: 97.6 F

## 2021-10-14 DIAGNOSIS — N93.8 DUB (DYSFUNCTIONAL UTERINE BLEEDING): Primary | ICD-10-CM

## 2021-10-14 LAB
ABO/RH: NORMAL
ANION GAP SERPL CALCULATED.3IONS-SCNC: 11 MMOL/L (ref 7–16)
ANTIBODY SCREEN: NORMAL
BUN BLDV-MCNC: 15 MG/DL (ref 6–20)
CALCIUM SERPL-MCNC: 9.3 MG/DL (ref 8.6–10.2)
CHLORIDE BLD-SCNC: 98 MMOL/L (ref 98–107)
CO2: 28 MMOL/L (ref 22–29)
CREAT SERPL-MCNC: 0.6 MG/DL (ref 0.5–1)
GFR AFRICAN AMERICAN: >60
GFR NON-AFRICAN AMERICAN: >60 ML/MIN/1.73
GLUCOSE BLD-MCNC: 272 MG/DL (ref 74–99)
HCG, URINE, POC: NEGATIVE
HCT VFR BLD CALC: 41.3 % (ref 34–48)
HEMOGLOBIN: 13.5 G/DL (ref 11.5–15.5)
Lab: NORMAL
MCH RBC QN AUTO: 30.7 PG (ref 26–35)
MCHC RBC AUTO-ENTMCNC: 32.7 % (ref 32–34.5)
MCV RBC AUTO: 93.9 FL (ref 80–99.9)
METER GLUCOSE: 204 MG/DL (ref 74–99)
NEGATIVE QC PASS/FAIL: NORMAL
PDW BLD-RTO: 13.1 FL (ref 11.5–15)
PLATELET # BLD: 228 E9/L (ref 130–450)
PMV BLD AUTO: 10.4 FL (ref 7–12)
POSITIVE QC PASS/FAIL: NORMAL
POTASSIUM SERPL-SCNC: 4.9 MMOL/L (ref 3.5–5)
RBC # BLD: 4.4 E12/L (ref 3.5–5.5)
SODIUM BLD-SCNC: 137 MMOL/L (ref 132–146)
WBC # BLD: 6.2 E9/L (ref 4.5–11.5)

## 2021-10-14 PROCEDURE — 2709999900 HC NON-CHARGEABLE SUPPLY: Performed by: OBSTETRICS & GYNECOLOGY

## 2021-10-14 PROCEDURE — 2720000010 HC SURG SUPPLY STERILE: Performed by: OBSTETRICS & GYNECOLOGY

## 2021-10-14 PROCEDURE — 6360000002 HC RX W HCPCS: Performed by: UROLOGY

## 2021-10-14 PROCEDURE — 2500000003 HC RX 250 WO HCPCS

## 2021-10-14 PROCEDURE — 85027 COMPLETE CBC AUTOMATED: CPT

## 2021-10-14 PROCEDURE — 7100000000 HC PACU RECOVERY - FIRST 15 MIN: Performed by: OBSTETRICS & GYNECOLOGY

## 2021-10-14 PROCEDURE — 86850 RBC ANTIBODY SCREEN: CPT

## 2021-10-14 PROCEDURE — 6360000002 HC RX W HCPCS

## 2021-10-14 PROCEDURE — 36415 COLL VENOUS BLD VENIPUNCTURE: CPT

## 2021-10-14 PROCEDURE — 3600000003 HC SURGERY LEVEL 3 BASE: Performed by: OBSTETRICS & GYNECOLOGY

## 2021-10-14 PROCEDURE — 80048 BASIC METABOLIC PNL TOTAL CA: CPT

## 2021-10-14 PROCEDURE — 86900 BLOOD TYPING SEROLOGIC ABO: CPT

## 2021-10-14 PROCEDURE — 86901 BLOOD TYPING SEROLOGIC RH(D): CPT

## 2021-10-14 PROCEDURE — 7100000001 HC PACU RECOVERY - ADDTL 15 MIN: Performed by: OBSTETRICS & GYNECOLOGY

## 2021-10-14 PROCEDURE — 3600000013 HC SURGERY LEVEL 3 ADDTL 15MIN: Performed by: OBSTETRICS & GYNECOLOGY

## 2021-10-14 PROCEDURE — 3700000000 HC ANESTHESIA ATTENDED CARE: Performed by: OBSTETRICS & GYNECOLOGY

## 2021-10-14 PROCEDURE — 3700000001 HC ADD 15 MINUTES (ANESTHESIA): Performed by: OBSTETRICS & GYNECOLOGY

## 2021-10-14 PROCEDURE — 88305 TISSUE EXAM BY PATHOLOGIST: CPT

## 2021-10-14 PROCEDURE — 2580000003 HC RX 258

## 2021-10-14 PROCEDURE — 82962 GLUCOSE BLOOD TEST: CPT

## 2021-10-14 PROCEDURE — 7100000011 HC PHASE II RECOVERY - ADDTL 15 MIN: Performed by: OBSTETRICS & GYNECOLOGY

## 2021-10-14 PROCEDURE — 7100000010 HC PHASE II RECOVERY - FIRST 15 MIN: Performed by: OBSTETRICS & GYNECOLOGY

## 2021-10-14 RX ORDER — ONDANSETRON 4 MG/1
4 TABLET, ORALLY DISINTEGRATING ORAL EVERY 8 HOURS PRN
Status: DISCONTINUED | OUTPATIENT
Start: 2021-10-14 | End: 2021-10-14 | Stop reason: HOSPADM

## 2021-10-14 RX ORDER — ONDANSETRON 2 MG/ML
4 INJECTION INTRAMUSCULAR; INTRAVENOUS
Status: DISCONTINUED | OUTPATIENT
Start: 2021-10-14 | End: 2021-10-14 | Stop reason: HOSPADM

## 2021-10-14 RX ORDER — IBUPROFEN 600 MG/1
600 TABLET ORAL EVERY 8 HOURS PRN
Status: DISCONTINUED | OUTPATIENT
Start: 2021-10-14 | End: 2021-10-14 | Stop reason: HOSPADM

## 2021-10-14 RX ORDER — SODIUM CHLORIDE 0.9 % (FLUSH) 0.9 %
10 SYRINGE (ML) INJECTION EVERY 12 HOURS SCHEDULED
Status: DISCONTINUED | OUTPATIENT
Start: 2021-10-14 | End: 2021-10-14 | Stop reason: HOSPADM

## 2021-10-14 RX ORDER — LIDOCAINE HYDROCHLORIDE 20 MG/ML
INJECTION, SOLUTION EPIDURAL; INFILTRATION; INTRACAUDAL; PERINEURAL PRN
Status: DISCONTINUED | OUTPATIENT
Start: 2021-10-14 | End: 2021-10-14 | Stop reason: SDUPTHER

## 2021-10-14 RX ORDER — ONDANSETRON 2 MG/ML
INJECTION INTRAMUSCULAR; INTRAVENOUS PRN
Status: DISCONTINUED | OUTPATIENT
Start: 2021-10-14 | End: 2021-10-14 | Stop reason: SDUPTHER

## 2021-10-14 RX ORDER — ROCURONIUM BROMIDE 10 MG/ML
INJECTION, SOLUTION INTRAVENOUS PRN
Status: DISCONTINUED | OUTPATIENT
Start: 2021-10-14 | End: 2021-10-14 | Stop reason: SDUPTHER

## 2021-10-14 RX ORDER — SODIUM CHLORIDE 0.9 % (FLUSH) 0.9 %
5-40 SYRINGE (ML) INJECTION EVERY 12 HOURS SCHEDULED
Status: DISCONTINUED | OUTPATIENT
Start: 2021-10-14 | End: 2021-10-14 | Stop reason: HOSPADM

## 2021-10-14 RX ORDER — OXYCODONE HYDROCHLORIDE AND ACETAMINOPHEN 5; 325 MG/1; MG/1
2 TABLET ORAL EVERY 4 HOURS PRN
Status: DISCONTINUED | OUTPATIENT
Start: 2021-10-14 | End: 2021-10-14 | Stop reason: HOSPADM

## 2021-10-14 RX ORDER — FENTANYL CITRATE 50 UG/ML
50 INJECTION, SOLUTION INTRAMUSCULAR; INTRAVENOUS EVERY 5 MIN PRN
Status: DISCONTINUED | OUTPATIENT
Start: 2021-10-14 | End: 2021-10-14 | Stop reason: HOSPADM

## 2021-10-14 RX ORDER — OXYCODONE HYDROCHLORIDE AND ACETAMINOPHEN 5; 325 MG/1; MG/1
1 TABLET ORAL EVERY 4 HOURS PRN
Status: DISCONTINUED | OUTPATIENT
Start: 2021-10-14 | End: 2021-10-14 | Stop reason: HOSPADM

## 2021-10-14 RX ORDER — GENTAMICIN SULFATE 80 MG/50ML
80 INJECTION, SOLUTION INTRAVENOUS
Status: COMPLETED | OUTPATIENT
Start: 2021-10-14 | End: 2021-10-14

## 2021-10-14 RX ORDER — FENTANYL CITRATE 50 UG/ML
INJECTION, SOLUTION INTRAMUSCULAR; INTRAVENOUS PRN
Status: DISCONTINUED | OUTPATIENT
Start: 2021-10-14 | End: 2021-10-14 | Stop reason: SDUPTHER

## 2021-10-14 RX ORDER — SODIUM CHLORIDE 0.9 % (FLUSH) 0.9 %
5-40 SYRINGE (ML) INJECTION PRN
Status: DISCONTINUED | OUTPATIENT
Start: 2021-10-14 | End: 2021-10-14 | Stop reason: HOSPADM

## 2021-10-14 RX ORDER — SODIUM CHLORIDE 9 MG/ML
25 INJECTION, SOLUTION INTRAVENOUS PRN
Status: DISCONTINUED | OUTPATIENT
Start: 2021-10-14 | End: 2021-10-14 | Stop reason: HOSPADM

## 2021-10-14 RX ORDER — ONDANSETRON 2 MG/ML
4 INJECTION INTRAMUSCULAR; INTRAVENOUS EVERY 6 HOURS PRN
Status: DISCONTINUED | OUTPATIENT
Start: 2021-10-14 | End: 2021-10-14 | Stop reason: HOSPADM

## 2021-10-14 RX ORDER — IBUPROFEN 800 MG/1
800 TABLET ORAL 3 TIMES DAILY PRN
Qty: 90 TABLET | Refills: 0 | Status: SHIPPED | OUTPATIENT
Start: 2021-10-14

## 2021-10-14 RX ORDER — SODIUM CHLORIDE 9 MG/ML
INJECTION, SOLUTION INTRAVENOUS CONTINUOUS PRN
Status: DISCONTINUED | OUTPATIENT
Start: 2021-10-14 | End: 2021-10-14 | Stop reason: SDUPTHER

## 2021-10-14 RX ORDER — SODIUM CHLORIDE, SODIUM LACTATE, POTASSIUM CHLORIDE, CALCIUM CHLORIDE 600; 310; 30; 20 MG/100ML; MG/100ML; MG/100ML; MG/100ML
INJECTION, SOLUTION INTRAVENOUS CONTINUOUS
Status: DISCONTINUED | OUTPATIENT
Start: 2021-10-14 | End: 2021-10-14 | Stop reason: HOSPADM

## 2021-10-14 RX ORDER — FENTANYL CITRATE 50 UG/ML
25 INJECTION, SOLUTION INTRAMUSCULAR; INTRAVENOUS EVERY 5 MIN PRN
Status: DISCONTINUED | OUTPATIENT
Start: 2021-10-14 | End: 2021-10-14 | Stop reason: HOSPADM

## 2021-10-14 RX ORDER — PHENAZOPYRIDINE HYDROCHLORIDE 100 MG/1
100 TABLET, FILM COATED ORAL 3 TIMES DAILY PRN
Status: DISCONTINUED | OUTPATIENT
Start: 2021-10-14 | End: 2021-10-14 | Stop reason: HOSPADM

## 2021-10-14 RX ORDER — MEPERIDINE HYDROCHLORIDE 25 MG/ML
25 INJECTION INTRAMUSCULAR; INTRAVENOUS; SUBCUTANEOUS EVERY 5 MIN PRN
Status: DISCONTINUED | OUTPATIENT
Start: 2021-10-14 | End: 2021-10-14 | Stop reason: HOSPADM

## 2021-10-14 RX ORDER — DEXAMETHASONE SODIUM PHOSPHATE 4 MG/ML
INJECTION, SOLUTION INTRA-ARTICULAR; INTRALESIONAL; INTRAMUSCULAR; INTRAVENOUS; SOFT TISSUE PRN
Status: DISCONTINUED | OUTPATIENT
Start: 2021-10-14 | End: 2021-10-14 | Stop reason: SDUPTHER

## 2021-10-14 RX ORDER — PROPOFOL 10 MG/ML
INJECTION, EMULSION INTRAVENOUS PRN
Status: DISCONTINUED | OUTPATIENT
Start: 2021-10-14 | End: 2021-10-14 | Stop reason: SDUPTHER

## 2021-10-14 RX ORDER — PHENAZOPYRIDINE HYDROCHLORIDE 100 MG/1
100 TABLET, FILM COATED ORAL 3 TIMES DAILY PRN
Qty: 20 TABLET | Refills: 0 | Status: SHIPPED | OUTPATIENT
Start: 2021-10-14 | End: 2021-10-21

## 2021-10-14 RX ORDER — MIDAZOLAM HYDROCHLORIDE 1 MG/ML
INJECTION INTRAMUSCULAR; INTRAVENOUS PRN
Status: DISCONTINUED | OUTPATIENT
Start: 2021-10-14 | End: 2021-10-14 | Stop reason: SDUPTHER

## 2021-10-14 RX ORDER — ACETAMINOPHEN 325 MG/1
650 TABLET ORAL EVERY 4 HOURS PRN
Status: DISCONTINUED | OUTPATIENT
Start: 2021-10-14 | End: 2021-10-14 | Stop reason: HOSPADM

## 2021-10-14 RX ORDER — SODIUM CHLORIDE 9 MG/ML
INJECTION, SOLUTION INTRAVENOUS CONTINUOUS
Status: DISCONTINUED | OUTPATIENT
Start: 2021-10-14 | End: 2021-10-14 | Stop reason: HOSPADM

## 2021-10-14 RX ORDER — SODIUM CHLORIDE 0.9 % (FLUSH) 0.9 %
10 SYRINGE (ML) INJECTION PRN
Status: DISCONTINUED | OUTPATIENT
Start: 2021-10-14 | End: 2021-10-14 | Stop reason: HOSPADM

## 2021-10-14 RX ADMIN — SUGAMMADEX 500 MG: 100 INJECTION, SOLUTION INTRAVENOUS at 14:05

## 2021-10-14 RX ADMIN — SODIUM CHLORIDE: 9 INJECTION, SOLUTION INTRAVENOUS at 13:05

## 2021-10-14 RX ADMIN — GENTAMICIN SULFATE 80 MG: 80 INJECTION, SOLUTION INTRAVENOUS at 13:21

## 2021-10-14 RX ADMIN — SODIUM CHLORIDE: 9 INJECTION, SOLUTION INTRAVENOUS at 14:05

## 2021-10-14 RX ADMIN — MIDAZOLAM 1 MG: 1 INJECTION INTRAMUSCULAR; INTRAVENOUS at 13:05

## 2021-10-14 RX ADMIN — FENTANYL CITRATE 100 MCG: 50 INJECTION, SOLUTION INTRAMUSCULAR; INTRAVENOUS at 13:15

## 2021-10-14 RX ADMIN — LIDOCAINE HYDROCHLORIDE 60 MG: 20 INJECTION, SOLUTION EPIDURAL; INFILTRATION; INTRACAUDAL; PERINEURAL at 13:15

## 2021-10-14 RX ADMIN — DEXAMETHASONE SODIUM PHOSPHATE 10 MG: 4 INJECTION, SOLUTION INTRAMUSCULAR; INTRAVENOUS at 13:26

## 2021-10-14 RX ADMIN — MIDAZOLAM 1 MG: 1 INJECTION INTRAMUSCULAR; INTRAVENOUS at 13:13

## 2021-10-14 RX ADMIN — ONDANSETRON 4 MG: 2 INJECTION INTRAMUSCULAR; INTRAVENOUS at 13:58

## 2021-10-14 RX ADMIN — PROPOFOL 200 MG: 10 INJECTION, EMULSION INTRAVENOUS at 13:15

## 2021-10-14 RX ADMIN — ROCURONIUM BROMIDE 40 MG: 10 INJECTION, SOLUTION INTRAVENOUS at 13:15

## 2021-10-14 ASSESSMENT — PULMONARY FUNCTION TESTS
PIF_VALUE: 37
PIF_VALUE: 36
PIF_VALUE: 36
PIF_VALUE: 1
PIF_VALUE: 35
PIF_VALUE: 11
PIF_VALUE: 43
PIF_VALUE: 35
PIF_VALUE: 35
PIF_VALUE: 0
PIF_VALUE: 28
PIF_VALUE: 36
PIF_VALUE: 35
PIF_VALUE: 36
PIF_VALUE: 42
PIF_VALUE: 35
PIF_VALUE: 36
PIF_VALUE: 35
PIF_VALUE: 41
PIF_VALUE: 1
PIF_VALUE: 1
PIF_VALUE: 41
PIF_VALUE: 36
PIF_VALUE: 35
PIF_VALUE: 36
PIF_VALUE: 35
PIF_VALUE: 24
PIF_VALUE: 5
PIF_VALUE: 36
PIF_VALUE: 34
PIF_VALUE: 35
PIF_VALUE: 36
PIF_VALUE: 1
PIF_VALUE: 35
PIF_VALUE: 41
PIF_VALUE: 35
PIF_VALUE: 2
PIF_VALUE: 5
PIF_VALUE: 35
PIF_VALUE: 6
PIF_VALUE: 35
PIF_VALUE: 0
PIF_VALUE: 36
PIF_VALUE: 35
PIF_VALUE: 41
PIF_VALUE: 36
PIF_VALUE: 36
PIF_VALUE: 35
PIF_VALUE: 4
PIF_VALUE: 28
PIF_VALUE: 1
PIF_VALUE: 35
PIF_VALUE: 35

## 2021-10-14 ASSESSMENT — PAIN SCALES - GENERAL
PAINLEVEL_OUTOF10: 0

## 2021-10-14 ASSESSMENT — PAIN - FUNCTIONAL ASSESSMENT: PAIN_FUNCTIONAL_ASSESSMENT: 0-10

## 2021-10-14 NOTE — BRIEF OP NOTE
Brief Postoperative Note      Patient: Ervin Fernandes  YOB: 1965  MRN: [id number]    Date of Procedure: 10/14/2021    Pre-Op Diagnosis: Refractory overactive bladder    Post-Op Diagnosis: Same       Procedure(s):  DILATATION AND CURETTAGE HYSTEROSCOPY  CYSTOSCOPY BOTOX INJECTION 200 UNTIS   (PHARMACY NOTIFIED)    Surgeon(s):  MD Jamaal Piña MD    Assistant:  * No surgical staff found *    Anesthesia: General    Estimated Blood Loss (mL): Minimal    Complications: None    Specimens:   ID Type Source Tests Collected by Time Destination   A : ENDOMETRIAL CURRETTINGS  Tissue Tissue SURGICAL PATHOLOGY Molly Horn MD 10/14/2021 1338        Implants:  * No implants in log *      Drains: * No LDAs found *    Findings: None    Electronically signed by Jamaal Herrera MD on 10/14/2021 at 1:55 PM

## 2021-10-14 NOTE — H&P
I have an overactive bladder. HPI: Orlin Garcia is a 60year-old female established patient who is here for evaluation of their overactive bladder.     She does have urgency. She does have problems getting to the bathroom in time after she has the urge to urinate. She does urinate more frequently than once every 4 hours in the daytime. She is not taking any medication for over active bladder.      She is not having problems with emptying her bladder well.      SHE FEELS BOTOX STILL IS EFFECTIVE FOR HER   HER BLOOD SUGAR  RECENTLY   NO RECENT UTI'S   SHE VOIDS OFTEN   NO GROSS HEMATURIA   SHE NO LONGER TAKES OAB MEDS         ALLERGIES: Penicillin        MEDICATIONS: Aleve 220 mg tablet   Atorvastatin Calcium 20 mg tablet   Botox 200 unit vial 1 vial Intravesically Q6M DX: N32.81   Diovan Hct 160 mg-25 mg tablet   Dulera 100 mcg-5 mcg/actuation hfa aerosol with adapter   Fluoxetine Hcl 40 mg capsule   Metformin Hcl Er 500 mg tablet, extended release 24 hr   Metoprolol Tartrate 25 mg tablet          PSH: Complex cystometrogram, w/ void pressure and urethral pressure profile studies, any technique - 2014  Complex Uroflow - 2014  Cysto Bladder Biopsy - 2012  Cystourethroscopy, W/Injection For Chemodenervation Of Bladder, BOTOX 200 UNITS - 1/24/2020 - at [address] [place] [address] B - [facility] - [id number], BOTOX 200 UNITS - 6/14/2019 - at [address] [place] [address] A - [facility], [address] [place] [address], [id number]-, 11/9/2018, 2018, 2017, 2016, 2016, 2015           NON- PSH: Emg surf Electrd - 2014  Intrabd [employer] - 2014  Urinary Reflex Study - 2014            PMH: Mixed incontinence - 2014  Gross hematuria  Overactive bladder  Urinary Tract Inf, Unspec site        NON- PMH: Essential (primary) hypertension  Gastro-esophageal reflux disease without esophagitis  Glycosuria  Major depressive disorder, single episode, unspecified  Obesity, unspecified  Type 2 diabetes mellitus without complications        FAMILY HISTORY: Diabetes - Mother      SOCIAL HISTORY: Marital Status:   Preferred Language: English; Ethnicity: Not  Or ; Race: White  Current Smoking Status: Patient does not smoke anymore. Does not use smokeless tobacco.  Does not use drugs. Does not drink caffeine. Patient's occupation is/was [employer]. Clair Bloodgood Notes: ONE DAUGHTER      REVIEW OF SYSTEMS:     Constitutional:   Patient denies fever, chills, and weight loss. Eyes:   Patient denies wearing glasses. Ears, Nose, Mouth, Throat:   Patient denies hearing loss. Cardiovascular:   Patient denies chest pains, swollen ankles, and irregular heartbeat. Respiratory:   Patient denies shortness of breath, wheezing, and chronic cough. Gastrointestinal:   Patient denies abdominal pain, nausea/vomiting, and change in bowels. Genitourinary:   Patient reports incontinence. Patient denies painful urination, blood in urine, howard catheter, and suprapubic catheter. Musculoskeletal:   Patient denies using cane, using walker, and using wheelchair. Integumentary/Skin:   Patient denies rash, persistent itching, and skin cancer history. Neurological:   Patient denies numbness, tingling, dizziness, and altered mental status. Hematologic/Lymphatic:   Patient denies swollen glands, abnormal bleeding, and history blood transfusion.      Notes: Reviewed previous review of systems 01/24/2020. No changes.      VITAL SIGNS:        Pulse 74 /min   Resp. Rate 16 /min       PHYSICAL EXAMINATION:     External Genitalia: No hirsutism, no rash, no scarring, no cyst, no erythematous lesion, no papular lesion, no blanched lesion, no warty lesion. No edema. Urethral Meatus: Normal size. Normal position. No discharge. Urethra: No tenderness, no mass, no scarring. No hypermobility. No leakage. Bladder: Normal to palpation, no tenderness, no mass, normal size. Vagina: No atrophy, no stenosis. No rectocele. No cystocele. No enterocele. Cervix: No inflammation, no discharge, no lesion, no tenderness, no wart. Uterus: Normal size. Normal consistency. Normal position. No mobility. No descent.      MULTI-SYSTEM PHYSICAL EXAMINATION:     Constitutional: Obese. No physical deformities. Normally developed. Good grooming. Neck: Neck symmetrical, not swollen. Normal tracheal position. Respiratory: No labored breathing, no use of accessory muscles. Cardiovascular: Normal temperature, normal extremity pulses, no swelling, no varicosities. Lymphatic: No enlargement of neck, axillae, groin. Skin: No paleness, no jaundice, no cyanosis. No lesion, no ulcer, no rash. Neurologic / Psychiatric: Oriented to time, oriented to place, oriented to person. No depression, no anxiety, no agitation. Gastrointestinal: No mass, no tenderness, no rigidity, non obese abdomen. Eyes: Normal conjunctivae. Normal eyelids. Ears, Nose, Mouth, and Throat: Left ear no scars, no lesions, no masses. Right ear no scars, no lesions, no masses. Nose no scars, no lesions, no masses. Normal hearing. Normal lips. Musculoskeletal: Normal gait and station of head and neck.         PAST DATA REVIEWED:   Source Of History:  Patient   Records Review:   Previous Patient Records      PROCEDURES:             Botox Injection 200u - 05686, [id number]  Risks, benefits, and some of the potential complications of the procedure were discussed at length with the patient including infection, bleeding, voiding discomfort, urinary retention, fever, chills, sepsis, and others. All questions were answered. Informed consent was signed.  Sterile technique and intraurethral analgesia were used.        ASSESSMENT:       ICD-10 Details   1 :   Overactive bladder - N32.81 Stable   2   Mixed incontinence - N39.46 Stable      PLAN:                 Medications  New Meds: Bactrim Ds 800 mg-160 mg tablet 1 tablet PO BID   #6  0 Refill(s)   Diflucan 150 mg tablet 1 tablet PO Daily   #1  0 Refill(s)         Schedule:  Cysto/Botox injection     The patient and I talked at length about conservative treatment of overactive bladder. Etiologies of overactive bladder including neurogenic bladder, stroke, Parkinson's disease, multiple sclerosis, neurological diseases, cystitis, UTI's, anxiety, extensive water drinking, and others were discussed with the patient. Alternative treatment options were discussed with the patient in detail. All questions were answered.     The patient gave fully informed consent to proceed with conservative therapy for their overactive bladder.     The patient was given instructions to call for abdominal pain, pelvic pain, perirectal pain, nausea, vomiting, diarrhea, fever over 100? ?F, chills, hematuria, dysuria, frequency, urgency, or urge incontinence.      I ENCOURAGED STRICT DIABETIC CONTROL ONCE AGAIN. SHE IS DOING WELL WITH BOTOX.  ORAL OAB MEDS REMAIN INEFFECTIVE FOR HER AND WILL NO LONGER BE PRESCRIBED

## 2021-10-14 NOTE — ANESTHESIA PRE PROCEDURE
Department of Anesthesiology  Preprocedure Note       Name:  Reilly Tomlin   Age:  60 y.o.  :  1965                                          MRN:  [id number]         Date:  10/14/2021      Surgeon: Danyell Houston):  MD Rhea Chairez MD    Procedure: Procedure(s):  DILATATION AND CURETTAGE HYSTEROSCOPY  CYSTOSCOPY BOTOX INJECTION 200 UNTIS   (PHARMACY NOTIFIED)    Medications prior to admission:   Prior to Admission medications    Medication Sig Start Date End Date Taking?  Authorizing Provider   buPROPion (WELLBUTRIN XL) 150 MG extended release tablet Take 150 mg by mouth every morning   Yes Historical Provider, MD   albuterol sulfate HFA (VENTOLIN HFA) 108 (90 Base) MCG/ACT inhaler Inhale 2 puffs into the lungs every 6 hours as needed for Wheezing 19  Yes Rui Peraza DO   valsartan-hydrochlorothiazide (DIOVAN-HCT) 160-25 MG per tablet Take 1 tablet by mouth daily   Yes Historical Provider, MD   atorvastatin (LIPITOR) 20 MG tablet Take 20 mg by mouth daily   Yes Historical Provider, MD   FLUoxetine (PROZAC) 40 MG capsule Take 40 mg by mouth daily   Yes Historical Provider, MD   metoprolol (LOPRESSOR) 25 MG tablet Take 50 mg by mouth nightly    Yes Historical Provider, MD   metFORMIN (GLUCOPHAGE) 500 MG tablet Take 500 mg by mouth 2 times daily (with meals)   Yes Historical Provider, MD   medroxyPROGESTERone (PROVERA) 10 MG tablet Take 1 tablet by mouth daily 21   Ashia Serrano MD   naproxen (NAPROSYN) 500 MG tablet Take 500 mg by mouth 2 times daily (with meals)    Historical Provider, MD       Current medications:    Current Facility-Administered Medications   Medication Dose Route Frequency Provider Last Rate Last Admin    0.9 % sodium chloride infusion   IntraVENous Continuous Arlene Valdez MD        sodium chloride flush 0.9 % injection 10 mL  10 mL IntraVENous 2 times per day Rhea Cantor MD        sodium chloride flush 0.9 % injection 10 mL  10 mL IntraVENous PRN Edmundo Valdez MD        0.9 % sodium chloride infusion  25 mL IntraVENous PRN James Carlson MD        gentamicin (GARAMYCIN) IVPB 80 mg  80 mg IntraVENous On Call to [facility], MD        sodium chloride flush 0.9 % injection 5-40 mL  5-40 mL IntraVENous 2 times per day Sherryle Chess, MD        sodium chloride flush 0.9 % injection 5-40 mL  5-40 mL IntraVENous PRN Sherryle Chess, MD        0.9 % sodium chloride infusion  25 mL IntraVENous PRN Sherryle Chess, MD        ondansetron [facility] injection 4 mg  4 mg IntraVENous Q6H PRN Sherryle Chess, MD        onabotulinumtoxin A (BOTOX) injection 200 Units  200 Units IntraMUSCular Once James Carlson MD           Allergies: Allergies   Allergen Reactions    Penicillins Rash       Problem List:    Patient Active Problem List   Diagnosis Code    Mild intermittent asthma without complication R92.54    KIA on CPAP G47.33, Z99.89    Gastroesophageal reflux disease without esophagitis K21.9    Morbid obesity ([facility] [address] E66.01       Past Medical History:        Diagnosis Date    Asthma     patient states under control    COVID-19 2020    Depression     Diabetes mellitus ([facility] [address]     Hyperlipidemia     Hypertension     KIA on CPAP     uses nightly    Postmenopausal bleeding        Past Surgical History:        Procedure Laterality Date    BACK SURGERY      lumbar laminectomy    COLONOSCOPY      TONSILLECTOMY         Social History:    Social History     Tobacco Use    Smoking status: Former Smoker     Packs/day: 1.00     Years: 20.00     Pack years: 20.00     Types: Cigarettes     Quit date:      Years since quittin.7    Smokeless tobacco: Never Used   Substance Use Topics    Alcohol use:  No                                Counseling given: Not Answered      Vital Signs (Current):   Vitals:    10/12/21 1600 10/14/21 1032   BP:  (!) 162/72   Pulse:  64 Resp:  16   Temp:  97.2 °F (36.2 °C)   TempSrc:  Temporal   SpO2:  92%   Weight: (!) 310 lb (140.6 kg) (!) 310 lb (140.6 kg)   Height: 5' 4\" (1.626 m) 5' 4\" (1.626 m)                                              BP Readings from Last 3 Encounters:   10/14/21 (!) 162/72   09/09/21 (!) 219/98   06/02/21 (!) 181/91       NPO Status:                                                                                 BMI:   Wt Readings from Last 3 Encounters:   10/14/21 (!) 310 lb (140.6 kg)   09/02/21 (!) 322 lb (146.1 kg)   06/02/21 (!) 322 lb (146.1 kg)     Body mass index is 53.21 kg/m². CBC:   Lab Results   Component Value Date    WBC 6.0 09/09/2021    RBC 4.62 09/09/2021    HGB 14.3 09/09/2021    HCT 43.9 09/09/2021    MCV 95.0 09/09/2021    RDW 13.6 09/09/2021     09/09/2021       CMP:   Lab Results   Component Value Date     09/09/2021    K 4.1 09/09/2021    CL 98 09/09/2021    CO2 30 09/09/2021    BUN 13 09/09/2021    CREATININE 0.5 09/09/2021    GFRAA >60 09/09/2021    LABGLOM >60 09/09/2021    GLUCOSE 231 09/09/2021    GLUCOSE 135 07/13/2011    PROT 7.3 05/04/2021    CALCIUM 9.3 09/09/2021    BILITOT 0.5 05/04/2021    ALKPHOS 93 05/04/2021    AST 20 05/04/2021    ALT 19 05/04/2021       POC Tests: No results for input(s): POCGLU, POCNA, POCK, POCCL, POCBUN, POCHEMO, POCHCT in the last 72 hours.     Coags: No results found for: PROTIME, INR, APTT    HCG (If Applicable): No results found for: PREGTESTUR, PREGSERUM, HCG, HCGQUANT     ABGs: No results found for: PHART, PO2ART, VMW0FSH, TPC5OVO, BEART, S1FOIRNH     Type & Screen (If Applicable):  No results found for: LABABO, LABRH    Drug/Infectious Status (If Applicable):  No results found for: HIV, HEPCAB    COVID-19 Screening (If Applicable):   Lab Results   Component Value Date    COVID19 Reactive 10/08/2021    COVID19 Not Detected 05/07/2021           Anesthesia Evaluation  Patient summary reviewed  Airway: Mallampati: III  TM distance: >3 FB Neck ROM: full  Mouth opening: > = 3 FB Dental:          Pulmonary: breath sounds clear to auscultation  (+) sleep apnea: on CPAP,  asthma:                            Cardiovascular:    (+) hypertension:,         Rhythm: regular  Rate: normal           Beta Blocker:  Dose within 24 Hrs         Neuro/Psych:   (+) psychiatric history:            GI/Hepatic/Renal:   (+) GERD:, morbid obesity          Endo/Other:    (+) DiabetesType II DM, , .                 Abdominal:   (+) obese,           Vascular: Other Findings:             Anesthesia Plan      general     ASA 3       Induction: intravenous. Anesthetic plan and risks discussed with patient. Plan discussed with CRNA.                   Daphne Estrada MD   10/14/2021

## 2021-10-14 NOTE — OP NOTE
[address] [place] [address].  Urology Post-operative Note    Derik Guzman  YOB: 1965  [id number]    Pre-operative Diagnosis: Refractory overactive bladder, mixed urinary incontinence    Post-operative Diagnosis:  Same    Procedure: Cystourethroscopy, intravesical Botox (200 units) injection    Surgeon: Sue Holland MD    Anesthesia:   General    Estimated Blood Loss:   Minimal    Complications: None    Drains: None    Specimen(s): None    Clinical Note:   58-year-old female with chronic refractory overactive voiding habits and mixed incontinence. She presents for the above listed procedure. The risks and benefits of the procedure including but not limited to infection, bleeding, bladder perforation or injury, retention, persistent overactive voiding habits etc. were discussed in detail and she elected to proceed    Operative Description:   She was taken the operating room placed on the OR table in supine position. She received IV Ancef preoperatively. Following duction of general anesthesia she was repositioned into the dorsolithotomy position. Her pannus was taped on her upper abdomen. Her external genitalia and abdomen were then prepped and draped sterilely. PCD's were placed preoperatively. A 21 Romanian cystoscope with 30 degree lens was inserted per urethra and in the bladder. Catheterized residual urine volume was only 100 cc. There were no urethral strictures false passages or tumors. Panendoscopic evaluation of bladder showed no clots stones tumors foreign bodies. Both ureteral openings were in the usual position on the trigone and there was clear reflux of urine from each. A flexible needle and sheath was inserted through the working channel and the cystoscope. 200 units of Botox was reconstituted in 20 cc of saline. 20 injections of the solution were performed along the right lateral bladder wall, dome, posterior bladder wall, left lateral bladder wall.   Each injection was 1 cc in volume. There was minimal if any bleeding. The needle and sheath were removed. There were no injections performed on the trigone. The cystoscope was removed. The procedure was then turned over to Dr. Marisol Acevedo who is to perform a D&C and dictate a separate operative report.       Edilma Mckenzie MD  10/14/2021  1:58 PM

## 2021-10-14 NOTE — ANESTHESIA POSTPROCEDURE EVALUATION
Department of Anesthesiology  Postprocedure Note    Patient: Mishel Pina  MRN: [id number]  YOB: 1965  Date of evaluation: 10/14/2021  Time:  4:46 PM     Procedure Summary     Date: 10/14/21 Room / Location: [facility] OR 05 / [facility]    Anesthesia Start: 2874 Anesthesia Stop: 0857    Procedures:       DILATATION AND CURETTAGE HYSTEROSCOPY, UTERINE POLYPECTOMY WITH MYOSURE (N/A Vagina )      CYSTOSCOPY BOTOX INJECTION 200 UNTIS   (PHARMACY NOTIFIED) (N/A ) Diagnosis: (EXCESSIVE MENSTRUATION IRREGULAR MENSTRUAL CYCLE)    Surgeons: Pieter Guardado MD; Santana Tejada MD Responsible Provider: Houston Junior MD    Anesthesia Type: general ASA Status: 3          Anesthesia Type: general    Jenaro Phase I: Jenaro Score: 8    Jenaro Phase II:      Last vitals: Reviewed and per EMR flowsheets.        Anesthesia Post Evaluation    Patient location during evaluation: PACU  Patient participation: complete - patient participated  Level of consciousness: awake  Pain score: 3  Airway patency: patent  Nausea & Vomiting: no nausea  Complications: no  Cardiovascular status: blood pressure returned to baseline  Respiratory status: acceptable  Hydration status: euvolemic

## 2021-10-14 NOTE — H&P
Julieth Matos is a 60 y.o. female patient. No diagnosis found. Past Medical History:   Diagnosis Date    Asthma     patient states under control    COVID-19 11/2020    Depression     Diabetes mellitus ([address]     Hyperlipidemia     Hypertension     KIA on CPAP     uses nightly    Postmenopausal bleeding      OB History    No obstetric history on file. Unknown  Estimated Date of Delivery: None noted. Allergies   Allergen Reactions    Penicillins Rash     Active Problems:    * No active hospital problems. *  Resolved Problems:    * No resolved hospital problems. *    Height 5' 4\" (1.626 m), weight (!) 310 lb (140.6 kg). History patient is originally seen in March 2021 with blood in the toilet and on her pants when she has been wiping. Prior Tramacet happened in 2018 the patient was menopausal.  FSH was repeated and was not menopausal.  In April 2021 an attempt was made to do an endometrial sampling however the patient could not lie flat long enough to tolerate the procedure. Therefore a D&C was consented. .  Patient canceled the procedure in May 2021 due to lack of medical clearance. This was obtained in June 2021. Eliseo Lambert Patient. Patient had hypertension on the day of the scheduled surgery and medical clearance was reobtained. The patient was apparently taking Myrbetriq which caused elevated blood pressures. .  Therefore a D&C is to be performed for irregular bleeding    Exam   Cardiovascular regular rhythm and rate. Respiratory clear to auscultation bilaterally. Abdomen morbidly obese. Assessment & Plan     Dysfunctional uterine bleeding. Thickened endometrium. FSH not menopausal.  COPD.   For hysteroscopy dilatation and curettage    Fareed Kidd MD  10/13/2021

## 2021-10-14 NOTE — OP NOTE
Operative Note      Patient: Niranjan Espinosa  YOB: 1965  MRN: [id number]    Date of Procedure: 10/14/2021    Pre-Op Diagnosis: EXCESSIVE MENSTRUATION IRREGULAR MENSTRUAL CYCLE    Post-Op Diagnosis: Same and endometrial polyp       Procedure(s):  DILATATION AND CURETTAGE HYSTEROSCOPY  CYSTOSCOPY BOTOX INJECTION 200 UNTIS   (PHARMACY NOTIFIED)  MyoSure polypectomy    Surgeon(s):  MD Jaky Reid MD    Assistant:   * No surgical staff found *    Anesthesia: General    Estimated Blood Loss (mL): Minimal    Complications: None    Specimens:   * No specimens in log *    Implants:  * No implants in log *      Drains: * No LDAs found *    Findings: Endometrial polyp    Detailed Description of Procedure:   After consent was confirmed the patient she was taken the operating room and prepped and draped dorsolithotomy position under general anesthesia. A weighted speculum space partially and retractor was placed anteriorly. And deliver the cervix as of the single-tooth tenaculum. This Dr. Jyotsna Flower had performed a cystoscopy and Botox injections in the bladder. Deep instruments were needed due to the patient's body habitus and the depth of the cervix and the angle of the cervix was difficult to negotiate with however we were able to dilate with some curved dilators and hysteroscope and adequate visualization the endometrial cavity follow-up a polypoid structure was noted coming from the anterior wall and plush endometrial tissue was noted. The MyoSure scope was then assembled and used to perform MyoSure polypectomy without complication. We are unable to pass an endometrial curette due to the patient's body habitus and the angle of the cervix however we were able to get tissue with the [facility] sure scope.   Upon completion of the procedure after hemostasis was noted on his rotation was removed and patient transferred recovery stable condition    Electronically signed by Rudy Kaur MD Malcolm on 10/14/2021 at 12:56 PM

## 2023-01-13 PROBLEM — M19.90 ARTHRITIS: Status: ACTIVE | Noted: 2023-01-13

## 2023-01-13 PROBLEM — E11.9 DIABETES MELLITUS (HCC): Status: ACTIVE | Noted: 2023-01-13

## 2025-03-27 LAB
ALBUMIN SERPL-MCNC: 3.8 G/DL (ref 3.5–5.2)
ALP SERPL-CCNC: 108 U/L (ref 35–104)
ALT SERPL-CCNC: 14 U/L (ref 0–32)
ANION GAP SERPL CALCULATED.3IONS-SCNC: 15 MMOL/L (ref 7–16)
AST SERPL-CCNC: 17 U/L (ref 0–31)
BILIRUB SERPL-MCNC: 0.3 MG/DL (ref 0–1.2)
BUN SERPL-MCNC: 16 MG/DL (ref 6–23)
CALCIUM SERPL-MCNC: 9.1 MG/DL (ref 8.6–10.2)
CHLORIDE SERPL-SCNC: 102 MMOL/L (ref 98–107)
CHOLEST SERPL-MCNC: 157 MG/DL
CO2 SERPL-SCNC: 24 MMOL/L (ref 22–29)
CREAT SERPL-MCNC: 0.8 MG/DL (ref 0.5–1)
ERYTHROCYTE [DISTWIDTH] IN BLOOD BY AUTOMATED COUNT: 13.4 % (ref 11.5–15)
GFR, ESTIMATED: 86 ML/MIN/1.73M2
GLUCOSE SERPL-MCNC: 191 MG/DL (ref 74–99)
HBA1C MFR BLD: 7.1 % (ref 4–5.6)
HCT VFR BLD AUTO: 38.5 % (ref 34–48)
HDLC SERPL-MCNC: 36 MG/DL
HGB BLD-MCNC: 12.7 G/DL (ref 11.5–15.5)
LDLC SERPL CALC-MCNC: 85 MG/DL
MCH RBC QN AUTO: 30.2 PG (ref 26–35)
MCHC RBC AUTO-ENTMCNC: 33 G/DL (ref 32–34.5)
MCV RBC AUTO: 91.4 FL (ref 80–99.9)
PLATELET # BLD AUTO: 218 K/UL (ref 130–450)
PMV BLD AUTO: 10.9 FL (ref 7–12)
POTASSIUM SERPL-SCNC: 4.3 MMOL/L (ref 3.5–5)
PROT SERPL-MCNC: 6.9 G/DL (ref 6.4–8.3)
RBC # BLD AUTO: 4.21 M/UL (ref 3.5–5.5)
SODIUM SERPL-SCNC: 141 MMOL/L (ref 132–146)
T4 SERPL-MCNC: 8.1 UG/DL (ref 4.5–11.7)
TRIGL SERPL-MCNC: 178 MG/DL
TSH SERPL DL<=0.05 MIU/L-ACNC: 1.04 UIU/ML (ref 0.27–4.2)
VLDLC SERPL CALC-MCNC: 36 MG/DL
WBC OTHER # BLD: 6 K/UL (ref 4.5–11.5)

## 2025-05-02 LAB
MICROORGANISM SPEC CULT: ABNORMAL
SERVICE CMNT-IMP: ABNORMAL
SPECIMEN DESCRIPTION: ABNORMAL

## (undated) DEVICE — GOWN,SIRUS,FABRNF,XL,20/CS: Brand: MEDLINE

## (undated) DEVICE — LENS CYSTOSCOPE 30 DEG

## (undated) DEVICE — COVER,LIGHT HANDLE,FLX,2/PK: Brand: MEDLINE INDUSTRIES, INC.

## (undated) DEVICE — CAMERA STRYKER 1488

## (undated) DEVICE — GAUZE,SPONGE,4"X4",16PLY,XRAY,STRL,LF: Brand: MEDLINE

## (undated) DEVICE — Z INACTIVE USE 2635503 SOLUTION IRRIG 3000ML ST H2O USP UROMATIC PLAS CONT

## (undated) DEVICE — TRAY SET D

## (undated) DEVICE — SYRINGE 20ML LL S/C 50

## (undated) DEVICE — Device

## (undated) DEVICE — BAG PRSS INFUS 1000ML 2 PRSS SFTY VLV RIG HNGR SLIP EASILY

## (undated) DEVICE — GLOVE ORANGE PI 7   MSG9070

## (undated) DEVICE — INJECTOR: Brand: INJECTOR

## (undated) DEVICE — TOWEL,OR,DSP,ST,BLUE,STD,6/PK,12PK/CS: Brand: MEDLINE

## (undated) DEVICE — Y-TYPE TUR/BLADDER IRRIGATION SET, REGULATING CLAMP

## (undated) DEVICE — TRAY,VAG PREP,2PR VNYL GLV,4 C: Brand: MEDLINE INDUSTRIES, INC.

## (undated) DEVICE — SOLUTION IV 1000ML 0.9% SOD CHL PH 5 INJ USP VIAFLX PLAS

## (undated) DEVICE — SOLUTION SURG PREP ANTIMICROBIAL 4 OZ SKIN WND EXIDINE

## (undated) DEVICE — SET FLD COLL CLR W/ BLT IN WARN SYS FOR HYSTSCP DOLPHIN

## (undated) DEVICE — JELLY,LUBE,STERILE,FLIP TOP,TUBE,2-OZ: Brand: MEDLINE

## (undated) DEVICE — GLOVE SURG SZ 7 L12IN FNGR THK94MIL TRNSLUC YEL LTX HYDRGEL

## (undated) DEVICE — MARKER,SKIN,WI/RULER AND LABELS: Brand: MEDLINE

## (undated) DEVICE — 4-PORT MANIFOLD: Brand: NEPTUNE 2

## (undated) DEVICE — GLOVE SURG SZ 6 THK91MIL LTX FREE SYN POLYISOPRENE ANTI

## (undated) DEVICE — SYRINGE, LUER LOCK, 10ML: Brand: MEDLINE

## (undated) DEVICE — DRAPE,REIN 53X77,STERILE: Brand: MEDLINE

## (undated) DEVICE — HOSE CONN FOR WST MGMT SYS NEPTUNE 2

## (undated) DEVICE — GLOVE ORANGE PI 7 1/2   MSG9075

## (undated) DEVICE — CYSTO PACK: Brand: MEDLINE INDUSTRIES, INC.

## (undated) DEVICE — INSTRUMENT HYSTERSCOPE 2.9 DEGREE REUSABLE

## (undated) DEVICE — TUBING, SUCTION, 3/16" X 12', STRAIGHT: Brand: MEDLINE

## (undated) DEVICE — GAUZE,SPONGE,4"X4",8PLY,STRL,LF,10/TRAY: Brand: MEDLINE

## (undated) DEVICE — DEVICE TISS REM DIA3MM L25.25IN ENDOSCP F/ IU POLYPS

## (undated) DEVICE — PAD,SANITARY,11 IN,MAXI,N-STRL,IND WRAP: Brand: MEDLINE

## (undated) DEVICE — DOUBLE BASIN SET: Brand: MEDLINE INDUSTRIES, INC.

## (undated) DEVICE — LEGGINGS, PAIR, 31X48, STERILE: Brand: MEDLINE